# Patient Record
Sex: FEMALE | Race: BLACK OR AFRICAN AMERICAN
[De-identification: names, ages, dates, MRNs, and addresses within clinical notes are randomized per-mention and may not be internally consistent; named-entity substitution may affect disease eponyms.]

---

## 2017-01-27 NOTE — ER DOCUMENT REPORT
ED GI/





- General


Chief Complaint: Epigastric Pain


Stated Complaint: EPIGASTRIC PAIN


Time seen by provider: 20:41


Mode of Arrival: Ambulatory


Information source: Patient


Notes: 


45 yo smoker,hypertensive, non dm, hx GERD, non hyperlipidemic, no drugs, CNA, c

/o constant fullness and pain retrosternal since wednesday, each episode lasts 

few seconds. Worse after eating after fried foods and uses hot sauce. Had to 

make herself throw up yesterday at work because she felt so full when she ate.  

Taking maalox and it hasn't helped. Was sitting at the house tonight and was 

feeling reyes, felt better after burping but when come right back. Afraid to 

eat because when she swallows she feels like it gets stuck. Also thinks she 

still has mucous in the area, the throat and midline chest area feels thick. No 

pain anywhere else. No abdominal pain. No nausea or vomiting. No hx ulcer or 

EGD in past. Mild diarrhea this am. takes nexium and norvasc. Had black bm at 

work (peptobismol day before). Etoh-4-5 mixed drinks every friday, occ. wed and 

sat.


TRAVEL OUTSIDE OF THE [place] IN LAST 30 DAYS: No





- Related Data


Allergies/Adverse Reactions: 


 





No Known Drug Allergies Allergy (Verified 01/27/17 18:24)


 











Past Medical History





- General


Information source: Patient





- Social History


Smoking Status: Current Some Day Smoker


Chew tobacco use (# tins/day): No


Frequency of alcohol use: 3 times week 4-5 mixed drink


Drug Abuse: None


Lives with: Family


Family History: Reviewed & Not Pertinent


Patient has suicidal ideation: No


Patient has homicidal ideation: No





- Past Medical History


Cardiac Medical History: Reports: Hx Hypertension


Neurological Medical History: Reports: Hx Migraine


Renal/ Medical History: Denies: Hx Peritoneal Dialysis


Musculoskeltal Medical History: Reports Hx Arthritis - Low Back


Past Surgical History: Reports: Hx Tubal Ligation





- Immunizations


Hx Diphtheria, Pertussis, Tetanus Vaccination: Yes





Review of Systems





- Review of Systems


Constitutional: No symptoms reported


EENT: No symptoms reported


Cardiovascular: Chest pain - see HPI


Respiratory: No symptoms reported


Gastrointestinal: See HPI


Genitourinary: No symptoms reported


Female Genitourinary: No symptoms reported


Musculoskeletal: No symptoms reported


Skin: No symptoms reported


Hematologic/Lymphatic: No symptoms reported


Neurological/Psychological: No symptoms reported





Physical Exam





- Vital signs


Vitals: 


 











Temp Pulse Resp BP Pulse Ox


 


 98.1 F   84   18   140/86 H  99 


 


 01/27/17 18:17  01/27/17 18:17  01/27/17 18:17  01/27/17 18:17  01/27/17 18:17











Interpretation: Normal





- General


General appearance: Appears well, Alert


In distress: None





- HEENT


Head: Normocephalic, Atraumatic


Eyes: Normal


Conjunctiva: Normal


Pupils: PERRL


Mouth/Lips: Normal


Mucous membranes: Normal


Neck: Supple.  No: Lymphadenopathy





- Respiratory


Respiratory status: No respiratory distress


Chest status: Nontender


Breath sounds: Normal


Chest palpation: Normal





- Cardiovascular


Rhythm: Regular


Heart sounds: Normal auscultation


Murmur: No





- Abdominal


Inspection: Normal


Distension: No distension


Bowel sounds: Normal


Tenderness: Tender - epigastrum.  No: Varner's sign, Guarding, Rebound


Organomegaly: No organomegaly





- Rectal


Tenderness: No


Stool: Heme negative, Other - brown


Hemorrhoids: None





- Back


Back: Normal, Nontender.  No: CVA tenderness





- Extremities


General upper extremity: Normal inspection, Nontender, Normal color, Normal ROM

, Normal temperature


General lower extremity: Normal inspection, Nontender, Normal color, Normal ROM

, Normal temperature, Normal weight bearing.  No: Obinna's sign





- Neurological


Neuro grossly intact: Yes


Cognition: Normal


Orientation: AAOx4


[place] Coma Scale Eye Opening: Spontaneous


[place] Coma Scale Verbal: Oriented


[place] Coma Scale Motor: Obeys Commands


[place] Coma Scale Total: 15


Speech: Normal


Motor strength normal: LUE, RUE, LLE, RLE


Sensory: Normal





- Psychological


Associated symptoms: Normal affect, Normal mood





- Skin


Skin Temperature: Warm


Skin Moisture: Dry


Skin Color: Normal


Skin irregularity: negative: Rash





Course





- Re-evaluation


Re-evalutation: 


01/27/17 22:36


I have consulted with the supervisory physician Dr. Franklin per [facility] 

Guidelines. OK to dispo home with GI consult referral.. Labs OK, stool for hem 

is negative. Chest xray negative. EKG NSR>





01/27/17 22:50








- Vital Signs


Vital signs: 


 











Temp Pulse Resp BP Pulse Ox


 


 98.1 F   84   18   140/86 H  99 


 


 01/27/17 18:17  01/27/17 18:17  01/27/17 18:17  01/27/17 18:17  01/27/17 18:17














- Laboratory


Result Diagrams: 


 01/27/17 18:40





 01/27/17 18:40


Laboratory results interpreted by me: 


 











  01/27/17 01/27/17





  18:40 18:40


 


Hgb  10.2 L 


 


Hct  31.9 L 


 


MCHC  31.9 L 


 


RDW  19.4 H 


 


Creatine Kinase   140 H














Discharge





- Discharge


Clinical Impression: 


 Epigastric abdominal pain, retrosternal chest fullness/burning





Condition: Good


Disposition: HOME, SELF-CARE


Instructions:  Low-Fat Diet ([facility]), Evaluation of Upper Abdominal Pain ([facility]), 

Chest Pain of Unclear Cause ([facility]), Gastritis ([facility]), Prilosec (Acid Pump 

Inhibitor) ([facility]), Acid-Suppressing Medication ([facility]), Gastroenterology


Additional Instructions: 


stop alcohol consumption


low fat, bland diet


start taking over the counter pepcid 20mg twice a day with the nexium


to er if worse


schedule appt with Gastroenterologist for endoscopy





Referrals: 


PASQUALE HUA NP [Primary Care Provider] - Follow up as needed

## 2017-01-27 NOTE — ER DOCUMENT REPORT
ED Medical Screen (RME)





- General


Stated Complaint: EPIGASTRIC PAIN


Time seen by provider: 18:23


Mode of Arrival: Ambulatory


Information source: Patient


Notes: 


46-year-old female presents to ED for epigastric pain.  She states she feels 

very full and can hear rumbling in her stomach and in her throat.  He states 

she has reflux and is on Nexium but she continues to have pain and burning in 

her abdomen is rumbling constantly.








I have greeted and performed a rapid initial assessment of this patient.  A 

comprehensive ED assessment and evaluation of the patient, analysis of test 

results and completion of medical decision making process will be conducted by 

an additional ED providers.


TRAVEL OUTSIDE OF THE [place] IN LAST 30 DAYS: No





- Related Data


Allergies/Adverse Reactions: 


 





amlodipine besylate [From Norvasc] Allergy (Verified 09/29/14 03:46)


 











Past Medical History





- Past Medical History


Cardiac Medical History: Reports: Hx Hypertension


Neurological Medical History: Reports: Hx Migraine


Musculoskeltal Medical History: Reports Hx Arthritis - Low Back


Past Surgical History: Reports: Hx Tubal Ligation





- Immunizations


Hx Diphtheria, Pertussis, Tetanus Vaccination: Yes

## 2018-01-26 ENCOUNTER — HOSPITAL ENCOUNTER (OUTPATIENT)
Dept: HOSPITAL 62 - WI | Age: 48
End: 2018-01-26
Attending: NURSE PRACTITIONER
Payer: COMMERCIAL

## 2018-01-26 DIAGNOSIS — Z12.31: Primary | ICD-10-CM

## 2018-01-26 PROCEDURE — 77063 BREAST TOMOSYNTHESIS BI: CPT

## 2018-01-26 PROCEDURE — 77067 SCR MAMMO BI INCL CAD: CPT

## 2018-01-26 NOTE — WOMENS IMAGING REPORT
EXAM DESCRIPTION:  3D SCREENING MAMMO BILAT



COMPLETED DATE/TIME:  1/26/2018 11:27 am



REASON FOR STUDY:  SCREENING MAMMO Z12.31  ENCNTR SCREEN MAMMOGRAM FOR MALIGNANT NEOPLASM OF DASHA



COMPARISON:  9/10/2015 and 5/23/2014.



TECHNIQUE:  Standard craniocaudal and mediolateral oblique views of each breast recorded using digita
l acquisition and breast tomosynthesis.



LIMITATIONS:  None.



FINDINGS:  No masses, calcifications or architectural distortion. No areas of suspicion.

Read with the assistance of CAD.

.[facility] - R2 Cenova Version 1.3

.[facility] - R2 Cenova Version 1.3

.[facility] - R2 Cenova Version 2.4

.[facility] - R2 Cenova Version 2.4

.[facility] - R2  Version 9.2



IMPRESSION:  NORMAL MAMMOGRAM.  BIRADS 1.



BREAST DENSITY:  c. The breasts are heterogeneously dense, which may obscure small masses.



BIRAD:  1 NEGATIVE



RECOMMENDATION:  ROUTINE SCREENING



COMMENT:  The patient has been notified of the results by letter per MQSA requirements. Additional no
tification policies are in place for contacting patient with suspicious or incomplete findings.

Quality ID #225: The American College of Radiology recommends an annual screening mammogram for women
 aged 40 years or over. This facility utilizes a reminder system to ensure that all patients receive 
reminder letters, and/or direct phone calls for appointments. This includes reminders for routine scr
eening mammograms, diagnostic mammograms, or other Breast Imaging Interventions when appropriate.  Th
is patient will be placed in the appropriate reminder system.

The American College of Radiology (ACR) has developed recommendations for screening MRI of the breast
s in certain patient populations, to be used in conjunction with mammography.  Breast MRI surveillanc
e may be appropriate for women with more than 20% lifetime risk of developing breast cancer  as deter
mined by genetic testing, significant family history of the disease, or history of mantle radiation f
or Hodgkins Disease.  ACR Practice Guidelines 2008.

[employer]



PQRS 6045F: Fluoroscopic imaging is not utilized for breast tomosynthesis.



TECHNICAL DOCUMENTATION:  FINDING NUMBER: (1)

ASSESSMENT:  (1)

JOB ID:  [id number]

 2011 [employer]- All Rights Reserved

## 2018-11-09 ENCOUNTER — HOSPITAL ENCOUNTER (OUTPATIENT)
Dept: HOSPITAL 62 - RAD | Age: 48
End: 2018-11-09
Attending: NURSE PRACTITIONER
Payer: COMMERCIAL

## 2018-11-09 DIAGNOSIS — G60.9: ICD-10-CM

## 2018-11-09 DIAGNOSIS — M26.52: ICD-10-CM

## 2018-11-09 DIAGNOSIS — K22.4: ICD-10-CM

## 2018-11-09 DIAGNOSIS — M46.90: ICD-10-CM

## 2018-11-09 DIAGNOSIS — M25.561: ICD-10-CM

## 2018-11-09 DIAGNOSIS — M54.5: Primary | ICD-10-CM

## 2018-11-09 DIAGNOSIS — M50.10: ICD-10-CM

## 2018-11-09 DIAGNOSIS — R10.84: ICD-10-CM

## 2018-11-09 PROCEDURE — 72110 X-RAY EXAM L-2 SPINE 4/>VWS: CPT

## 2018-11-09 PROCEDURE — 72070 X-RAY EXAM THORAC SPINE 2VWS: CPT

## 2018-11-09 PROCEDURE — 72200 X-RAY EXAM SI JOINTS: CPT

## 2018-11-09 PROCEDURE — 72050 X-RAY EXAM NECK SPINE 4/5VWS: CPT

## 2018-11-09 NOTE — RADIOLOGY REPORT (SQ)
EXAM DESCRIPTION:  CERV SP 4 OR 5 VIEWS



COMPLETED DATE/TIME:  11/9/2018 11:27 am



REASON FOR STUDY:  CERVICAL DISC DISORDER W RADICULOPATHY, UNSP CERVICAL REGION M54.5  LOW BACK PAIN 
M25.561  PAIN IN RIGHT KNEE M26.52  LIMITED MANDIBULAR RANGE OF MOTION



COMPARISON:  None.



NUMBER OF VIEWS:  Five views.



TECHNIQUE:  AP, lateral, obliques and odontoid radiographic images acquired of the cervical spine.



LIMITATIONS:  None.



FINDINGS:  MINERALIZATION: Normal.

ALIGNMENT: Anatomic.

VERTEBRAE: Vertebral bodies of normal height.

DISCS: Moderate disc space loss at C 5 6 with mild disc space loss at C4-5 and C6-7.  Moderate-sized 
osteophytes.

FORAMINA: No osteophytes or foraminal narrowing.

LATERAL AND POSTERIOR ELEMENTS: Facets, lateral masses and spinous processes without significant find
ings.

HARDWARE: None in the spine.

SOFT TISSUES: No masses or calcifications. Lung apices clear.

OTHER: No other significant finding.



IMPRESSION:  Mild to moderate degenerative changes in the lower cervical spine.



TECHNICAL DOCUMENTATION:  JOB ID:  [id number]

 2011 [employer]- All Rights Reserved



Reading location - IP/workstation name: [email or web address]

## 2018-11-09 NOTE — RADIOLOGY REPORT (SQ)
EXAM DESCRIPTION:  KNEE LEFT 4 VIEW



COMPLETED DATE/TIME:  11/9/2018 11:27 am



REASON FOR STUDY:  KNEE PAIN M54.5  LOW BACK PAIN M25.561  PAIN IN RIGHT KNEE M26.52  LIMITED MANDIBU
LAR RANGE OF MOTION



COMPARISON:  None.



NUMBER OF VIEWS:  Five views.



TECHNIQUE:  AP, lateral, and both oblique radiographic images acquired of the left knee.



LIMITATIONS:  None.



FINDINGS:  MINERALIZATION: Normal.

BONES: No acute fracture or dislocation.  No worrisome bone lesions.

JOINT: No effusion.

SOFT TISSUES: No soft tissue swelling.  No radio-opaque foreign body.

OTHER: No other significant finding.



IMPRESSION:  No significant abnormalities involving the left knee.



TECHNICAL DOCUMENTATION:  JOB ID:  [id number]

 2011 [employer]- All Rights Reserved



Reading location - IP/workstation name: [email or web address]

## 2018-11-09 NOTE — RADIOLOGY REPORT (SQ)
EXAM DESCRIPTION:  SACROILIAC JOINTS



COMPLETED DATE/TIME:  11/9/2018 11:27 am



REASON FOR STUDY:  LOW BACK PAIN M54.5  LOW BACK PAIN M25.561  PAIN IN RIGHT KNEE M26.52  LIMITED MAN
DIBULAR RANGE OF MOTION



COMPARISON:  None.



NUMBER OF VIEWS:  Three views.



TECHNIQUE:  AP and oblique views of the sacroiliac joints.



LIMITATIONS:  None.



FINDINGS:  MINERALIZATION: Normal.

BONES: No acute fracture or dislocation. No worrisome bone lesions. Moderate degenerative disc diseas
e at L4-5.

JOINTS: The sacroiliac joints are patent.  No unusual widening, sclerosis, or fusion.

SOFT TISSUES: No soft tissue swelling.  No radio-opaque foreign body.

OTHER: No  other significant finding.



IMPRESSION:  1.  NORMAL STUDY OF THE SACROILIAC JOINTS.

2.  Moderate degenerative disc disease at L4-5



TECHNICAL DOCUMENTATION:  JOB ID:  [id number]

 2011 [employer]- All Rights Reserved



Reading location - IP/workstation name: [email or web address]

## 2018-11-09 NOTE — RADIOLOGY REPORT (SQ)
EXAM DESCRIPTION:  KNEE RIGHT 4 VIEWS



COMPLETED DATE/TIME:  11/9/2018 11:27 am



REASON FOR STUDY:  KNEE PAIN M54.5  LOW BACK PAIN M25.561  PAIN IN RIGHT KNEE M26.52  LIMITED MANDIBU
LAR RANGE OF MOTION



COMPARISON:  None.



NUMBER OF VIEWS:  Four views.



TECHNIQUE:  AP, lateral, and both oblique radiographic images acquired of the right knee.



LIMITATIONS:  None.



FINDINGS:  MINERALIZATION: Normal.

BONES: No acute fracture or dislocation.  No worrisome bone lesions.

JOINT: No effusion.

SOFT TISSUES: No soft tissue swelling.  No radio-opaque foreign body.

OTHER: No other significant finding.



IMPRESSION:  No significant abnormalities involving the right knee.



TECHNICAL DOCUMENTATION:  JOB ID:  [id number]

 2011 [email or web address]- All Rights Reserved



Reading location - IP/workstation name: [email or web address]

## 2018-11-09 NOTE — RADIOLOGY REPORT (SQ)
EXAM DESCRIPTION:  T SPINE AP/LAT



COMPLETED DATE/TIME:  11/9/2018 11:27 am



REASON FOR STUDY:  UNSPEC INFLAMMATORY SPONDYLOPATHY, SITE UNSPEC M54.5  LOW BACK PAIN M25.561  PAIN 
IN RIGHT KNEE M26.52  LIMITED MANDIBULAR RANGE OF MOTION



COMPARISON:  None.



NUMBER OF VIEWS:  Two views.



TECHNIQUE:  AP and lateral radiographic images acquired of the thoracic spine.



LIMITATIONS:  None.



FINDINGS:  MINERALIZATION: Normal.

ALIGNMENT: Minimal scoliosis convex to the right.

VERTEBRAE: No fracture or bone lesion.  Maintained height, normal segmentation.

DISCS: Multilevel mild to moderate degenerative disc disease.

HARDWARE: None in the spine.

MEDIASTINUM AND SOFT TISSUES: Normal heart size and aortic contour.  No soft tissue abnormality.

VISUALIZED LUNG FIELDS: Clear.

OTHER: No other significant finding.



IMPRESSION:  Multilevel mild to moderate degenerative disc disease in the thoracic spine.



TECHNICAL DOCUMENTATION:  JOB ID:  [id number]

 2011 [email or web address]- All Rights Reserved



Reading location - IP/workstation name: [email or web address]

## 2018-11-09 NOTE — RADIOLOGY REPORT (SQ)
EXAM DESCRIPTION:  L SPINE WHOLE



COMPLETED DATE/TIME:  11/9/2018 11:27 am



REASON FOR STUDY:  LOW BACK PAIN M54.5  LOW BACK PAIN M25.561  PAIN IN RIGHT KNEE M26.52  LIMITED MAN
DIBULAR RANGE OF MOTION



COMPARISON:  None.



NUMBER OF VIEWS:  Five views including obliques.



TECHNIQUE:  AP, lateral, oblique, and sacral radiographic images acquired of the lumbar spine.



LIMITATIONS:  None.



FINDINGS:  MINERALIZATION: Normal.

SEGMENTATION: Normal.  No transitional anatomy.

ALIGNMENT: Minimal scoliosis.

VERTEBRAE: Maintained height.  No fracture or worrisome bone lesion.

DISCS: Moderate degenerative disc disease at L4-5.  Remaining disc spaces are well maintained.

POSTERIOR ELEMENTS: Pedicles and facets are intact.  No pars defect or posterior arch defects.

HARDWARE: None in the spine.

PARASPINAL SOFT TISSUES: Normal.

PELVIS: Intact as visualized. No fractures or worrisome bone lesions. SI joints intact.

OTHER: No other significant finding.



IMPRESSION:  Moderate degenerative disc disease at L4-5.



TECHNICAL DOCUMENTATION:  JOB ID:  [id number]

 2011 [employer]- All Rights Reserved



Reading location - IP/workstation name: [email or web address]

## 2019-06-03 ENCOUNTER — HOSPITAL ENCOUNTER (OUTPATIENT)
Dept: HOSPITAL 62 - WI | Age: 49
End: 2019-06-03
Attending: NURSE PRACTITIONER
Payer: COMMERCIAL

## 2019-06-03 DIAGNOSIS — Z12.31: Primary | ICD-10-CM

## 2019-06-03 PROCEDURE — 77067 SCR MAMMO BI INCL CAD: CPT

## 2019-06-03 PROCEDURE — 77063 BREAST TOMOSYNTHESIS BI: CPT

## 2019-06-03 NOTE — WOMENS IMAGING REPORT
EXAM DESCRIPTION:  3D SCREENING MAMMO BILAT



COMPLETED DATE/TIME:  6/3/2019 8:25 am



REASON FOR STUDY:  Z12.31 ROUTINE 3D BILATERAL SCREENING Z12.31  ENCNTR SCREEN MAMMOGRAM FOR MALIGNAN
T NEOPLASM OF DASHA



COMPARISON:  [id number]



EXAM PARAMETERS:  Views: Standard craniocaudal and mediolateral oblique views of each breast recorded
 using digital acquisition and breast tomosynthesis.

Read with the assistance of CAD.

.[facility] - Revl  Version 9.2



LIMITATIONS:  None.



FINDINGS:  No suspicious masses, suspicious calcifications or architectural distortion. No areas of c
oncern.



IMPRESSION:   Assessment:  Negative MAMMOGRAM.  BIRADS 1.



BREAST DENSITY:  b. There are scattered areas of fibroglandular density.



BIRAD:  1 NEGATIVE



RECOMMENDATION:  ROUTINE SCREENING



COMMENT:  The patient has been notified of the results by letter per MQSA requirements. Additional no
tification policies are in place for contacting patient with suspicious or incomplete findings.

Quality ID #225: The American College of Radiology recommends an annual screening mammogram for women
 aged 40 years or over. This facility utilizes a reminder system to ensure that all patients receive 
reminder letters, and/or direct phone calls for appointments. This includes reminders for routine scr
eening mammograms, diagnostic mammograms, or other Breast Imaging Interventions when appropriate.  Th
is patient will be placed in the appropriate reminder system.



TECHNICAL DOCUMENTATION:  FINDING NUMBER: (1)

ASSESSMENT:  (1)

JOB ID:  [id number]

 2011 [employer]- All Rights Reserved



Reading location - IP/workstation name: [email or web address]

## 2019-07-26 ENCOUNTER — HOSPITAL ENCOUNTER (OUTPATIENT)
Dept: HOSPITAL 62 - RAD | Age: 49
End: 2019-07-26
Attending: NURSE PRACTITIONER
Payer: COMMERCIAL

## 2019-07-26 DIAGNOSIS — M25.562: ICD-10-CM

## 2019-07-26 DIAGNOSIS — R29.898: ICD-10-CM

## 2019-07-26 DIAGNOSIS — M25.561: Primary | ICD-10-CM

## 2019-07-26 NOTE — RADIOLOGY REPORT (SQ)
EXAM DESCRIPTION:  KNEE LEFT 4 VIEW



COMPLETED DATE/TIME:  7/26/2019 2:35 pm



REASON FOR STUDY:  BILATERAL KNEE PAIN (M25.561, M25.562) M25.561  PAIN IN RIGHT KNEE M25.562  PAIN I
N LEFT KNEE R29.898  OTH SYMPTOMS AND SIGNS INVOLVING THE MUSCULOSKELETAL



COMPARISON:  None.



NUMBER OF VIEWS:  Four views.



TECHNIQUE:  AP, lateral, and both oblique radiographic images acquired of the left knee.



LIMITATIONS:  None.



FINDINGS:  MINERALIZATION: Normal.

BONES: No acute fracture or dislocation. No worrisome bone lesions. No significant osteophytes.

JOINT: No effusion.  No chondrocalcinosis.

OTHER: No other significant finding.



IMPRESSION:  NEGATIVE STUDY OF THE LEFT KNEE. NO EXPLANATION FOR PAIN.



TECHNICAL DOCUMENTATION:  JOB ID:  [id number]

 2011 [employer]- All Rights Reserved



Reading location - IP/workstation name: [email or web address]

## 2019-07-26 NOTE — RADIOLOGY REPORT (SQ)
EXAM DESCRIPTION:  KNEE RIGHT 4 VIEWS



COMPLETED DATE/TIME:  7/26/2019 2:35 pm



REASON FOR STUDY:  BILATERAL KNEE PAIN (M25.561, M25.562) M25.561  PAIN IN RIGHT KNEE M25.562  PAIN I
N LEFT KNEE R29.898  [facility] SYMPTOMS AND SIGNS INVOLVING THE MUSCULOSKELETAL



COMPARISON:  11/9/2018



NUMBER OF VIEWS:  Four views.



TECHNIQUE:  AP, lateral, and both oblique radiographic images acquired of the right knee.



LIMITATIONS:  None.



FINDINGS:  MINERALIZATION: Normal.

BONES: No acute fracture or dislocation. No worrisome bone lesions. No significant osteophytes.

JOINT: No effusion.  No chondrocalcinosis.

OTHER: No other significant finding.



IMPRESSION:  NEGATIVE STUDY OF THE RIGHT KNEE. NO EXPLANATION FOR PAIN.



TECHNICAL DOCUMENTATION:  JOB ID:  [id number]

 2011 [employer]- All Rights Reserved



Reading location - IP/workstation name: [email or web address]

## 2020-03-27 ENCOUNTER — HOSPITAL ENCOUNTER (OUTPATIENT)
Dept: HOSPITAL 62 - RAD | Age: 50
End: 2020-03-27
Attending: NURSE PRACTITIONER
Payer: COMMERCIAL

## 2020-03-27 DIAGNOSIS — K44.9: ICD-10-CM

## 2020-03-27 DIAGNOSIS — K21.9: Primary | ICD-10-CM

## 2020-03-27 DIAGNOSIS — R13.10: ICD-10-CM

## 2020-03-27 PROCEDURE — 74220 X-RAY XM ESOPHAGUS 1CNTRST: CPT

## 2020-03-27 NOTE — RADIOLOGY REPORT (SQ)
EXAM DESCRIPTION:  BARIUM SWALLOW ESOPHAGUS



COMPLETED DATE/TIME:  3/27/2020 8:25 am



REASON FOR STUDY:  GERD (K21.9), DYSPHAGIA (R13.10) K21.9  GASTRO-ESOPHAGEAL REFLUX DISEASE WITHOUT E
SOPHAGITIS R13.10  DYSPHAGIA, UNSPECIFIED



COMPARISON:  None.



TECHNIQUE:  Under fluoroscopic guidance, patient ingested effervescent granules followed by thick and
 thin barium. Fluoroscopic spot images and routine radiographic images acquired and stored on PACS.

12 MM BARIUM TABLET GIVEN: Yes.

No significant delay in passage.



LIMITATIONS:  None.



FLUOROSCOPY TIME:  FLUORO TIME: 2.2 minutes of fluoroscopy was used.

11 images saved to PACS.



FINDINGS:  NEUROMUSCULAR COORDINATION OF SWALLOW: Normal. No aspiration.

ESOPHAGEAL MOTILITY: Weak primary peristalsis with stasis of barium throughout the esophagus. No esop
hageal spasm.

ESOPHAGEAL MUCOSA: Normal mucosa without masses or ulceration.

GASTRO-ESOPHAGEAL JUNCTION: Small sliding hiatal hernia with mild gastroesophageal reflux.

NON-GI TRACT STRUCTURES: No significant finding.

OTHER: No other significant finding.



IMPRESSION:  ESOPHAGEAL DYSMOTILITY WITH SMALL SLIDING HIATAL HERNIA AND MILD GASTROESOPHAGEAL REFLUX
.



COMMENT:  Quality :  Final reports for procedures using fluoroscopy that document radiation exp
osure indices, or exposure time and number of fluorographic images (if radiation exposure indices are
 not available)



TECHNICAL DOCUMENTATION:  JOB ID:  [id number]

 2011 [employer]- All Rights Reserved



Reading location - IP/workstation name: [email or web address] [id number]

## 2020-04-17 ENCOUNTER — HOSPITAL ENCOUNTER (OUTPATIENT)
Dept: HOSPITAL 62 - RAD | Age: 50
End: 2020-04-17
Attending: INTERNAL MEDICINE
Payer: COMMERCIAL

## 2020-04-17 DIAGNOSIS — R07.9: Primary | ICD-10-CM

## 2020-04-17 PROCEDURE — 76705 ECHO EXAM OF ABDOMEN: CPT

## 2020-04-17 NOTE — RADIOLOGY REPORT (SQ)
EXAM DESCRIPTION:  U/S ABDOMEN LIMITED W/O DOP



IMAGES COMPLETED DATE/TIME:  4/17/2020 9:04 am



REASON FOR STUDY:  CHEST PAIN, UNSPECIFIED R07.9  CHEST PAIN, UNSPECIFIED



COMPARISON:  None.



TECHNIQUE:  Dynamic and static grayscale images acquired of the abdomen and recorded on PACS. Additio
nal selected color Doppler and spectral images recorded.



LIMITATIONS:  None.



FINDINGS:  PANCREAS: The visualized portions of the pancreas appear normal.

LIVER: The echogenicity of hepatic parenchyma is increased compared to that of the renal parenchymal.


LIVER VASCULATURE: Hepatopetal directional flow within the portal veins.  The hepatic veins are paten
t.

GALLBLADDER: The gallbladder wall measures 2.8 mm in thickness.  There is no cholelithiasis, sludge o
r pericholecystic fluid.

ULTRASOUND-DETECTED POE'S SIGN: Negative.

INTRAHEPATIC DUCTS AND COMMON DUCT: The common bile duct measures 4.5 mm in diameter.  The intrahepat
ic bile ducts are normal in caliber.

INFERIOR VENA CAVA: Patent.

AORTA: No aneurysm.

RIGHT KIDNEY:  The right kidney measures 10.2 cm in length.  There is no hydronephrosis.

PERITONEAL AND RIGHT PLEURAL SPACE: No ascites or effusions.

OTHER: No other findings.



IMPRESSION:  1. Increased echogenicity of hepatic parenchyma.  The finding is indicative of diffuse h
epatocellular disease the most common etiology being hepatic steatosis.

2.  No other abnormality.



TECHNICAL DOCUMENTATION:  JOB ID:  [id number]

 2011 [employer]- All Rights Reserved



Reading location - IP/workstation name: [email or web address]

## 2020-10-02 ENCOUNTER — HOSPITAL ENCOUNTER (OUTPATIENT)
Dept: HOSPITAL 62 - WI | Age: 50
End: 2020-10-02
Attending: NURSE PRACTITIONER
Payer: COMMERCIAL

## 2020-10-02 DIAGNOSIS — Z12.31: Primary | ICD-10-CM

## 2020-10-02 DIAGNOSIS — N64.89: ICD-10-CM

## 2020-10-02 DIAGNOSIS — Z80.3: ICD-10-CM

## 2020-10-02 PROCEDURE — 77063 BREAST TOMOSYNTHESIS BI: CPT

## 2020-10-02 PROCEDURE — 77067 SCR MAMMO BI INCL CAD: CPT

## 2020-10-02 NOTE — WOMENS IMAGING REPORT
EXAM DESCRIPTION:  3D SCREENING MAMMO BILAT



IMAGES COMPLETED DATE/TIME:  10/2/2020 11:02 am



REASON FOR STUDY:  Z12.31 ENCOUNTER FOR SCREENING MAMMOGRAM FOR MALIGNANT NEOPLASM OF BREAST Z12.31  
ENCNTR SCREEN MAMMOGRAM FOR MALIGNANT NEOPLASM OF DASHA



COMPARISON:  2015 and subsequent.



EXAM PARAMETERS:  Standard craniocaudal and mediolateral oblique views of each breast recorded using 
digital acquisition and breast tomosynthesis.

Read with the assistance of CAD.

.[facility] - R2  Version 9.2



LIMITATIONS:  None.



FINDINGS:  RIGHT BREAST

MASSES: No suspicious masses.

CALCIFICATIONS: No new or suspicious calcifications.

ARCHITECTURAL DISTORTION: None.

ASYMMETRY: Focal asymmetry MLO view only, likely upper-outer quadrant 7 cm from the nipple.

OTHER: No other significant findings.

LEFT BREAST

MASSES: No suspicious masses.

CALCIFICATIONS: No new or suspicious calcifications.

ARCHITECTURAL DISTORTION: None.

ASYMMETRY: None noted.

OTHER: No other significant findings.



IMPRESSION:  Focal asymmetry right breast may simply represent under compressed tissue, however furth
er evaluation is warranted with diagnostic right spot compression mammography and potentially ultraso
und.

0 Incomplete: Needs Additional Imaging Evaluation and/or prior Mammograms for Comparison.



BREAST DENSITY:  c. The breasts are heterogeneously dense, which may obscure small masses.



BIRAD:  ASSESSMENT:  0 Incomplete:  Needs Additional Imaging Evaluation and/or prior Mammograms for C
omparison.



RECOMMENDATION:  RECOMMENDED FOLLOW-UP: As above.

The patient will be contacted for additional imaging.



COMMENT:  The patient has been notified of the results by letter per MQSA requirements. Additional no
tification policies are in place for contacting patient with suspicious or incomplete findings.

Quality ID #225: The American College of Radiology recommends an annual screening mammogram for women
 aged 40 years or over. This facility utilizes a reminder system to ensure that all patients receive 
reminder letters, and/or direct phone calls for appointments. This includes reminders for routine scr
eening mammograms, diagnostic mammograms, or other Breast Imaging Interventions when appropriate.  Th
is patient will be placed in the appropriate reminder system.



TECHNICAL DOCUMENTATION:  FINDING NUMBER: (1)

ASSESSMENT: (1)

JOB ID:  [id number]

 2011 [employer]- All Rights Reserved



Reading location - IP/workstation name: [email or web address]

## 2020-10-16 ENCOUNTER — HOSPITAL ENCOUNTER (OUTPATIENT)
Dept: HOSPITAL 62 - RAD | Age: 50
End: 2020-10-16
Attending: INTERNAL MEDICINE
Payer: COMMERCIAL

## 2020-10-16 DIAGNOSIS — M51.36: ICD-10-CM

## 2020-10-16 DIAGNOSIS — R10.13: ICD-10-CM

## 2020-10-16 DIAGNOSIS — R07.9: Primary | ICD-10-CM

## 2020-10-16 PROCEDURE — 74170 CT ABD WO CNTRST FLWD CNTRST: CPT

## 2020-10-16 PROCEDURE — 82565 ASSAY OF CREATININE: CPT

## 2020-10-16 NOTE — RADIOLOGY REPORT (SQ)
EXAM DESCRIPTION:  CT ABDOMEN COMBO



IMAGES COMPLETED DATE/TIME:  10/16/2020 9:48 am



REASON FOR STUDY:  R07.9 CHEST PAIN, UNSPECIFIED R10.13 EPIGASTRIC PAIN R07.9  CHEST PAIN, UNSPECIFIE
D R10.13  EPIGASTRIC PAIN



COMPARISON:  None.



TECHNIQUE:  CT scan of the abdomen performed with and without intravenous contrast, and with oral con
trast. Contrasted imaging performed using helical scanning technique with dynamic intravenous contras
t injection. Images reviewed with lung, soft tissue, and bone windows. Reconstructed coronal and sagi
ttal MPR images reviewed. Delayed images for evaluation of the urinary system also acquired and evalu
ated. All images stored on PACS.

All CT scanners at this facility use dose modulation, iterative reconstruction, and/or weight based d
osing when appropriate to reduce radiation dose to as low as reasonably achievable (ALARA).

[facility]: Dose Right  [facility]: CareDose    [facility]: Dose Right    [facility]: Teradose 4D    [facility]: Smart Technologies



CONTRAST TYPE AND DOSE:  contrast/concentration: Isovue 350.00 mmol/ml; Total Contrast Delivered: 80.
0 ml; Total Saline Delivered: 45.0 ml



RENAL FUNCTION:  Creatinine 0.5



RADIATION DOSE:  CT Rad equipment meets quality standard of care and radiation dose reduction techniq
ues were employed. CTDIvol: 9.6 - 10.3 mGy. DLP: 1060 mGy-cm..



LIMITATIONS:  None.



FINDINGS:  NONCONTRASTED IMAGING: See below

POSTCONTRASTED IMAGING:

LOWER CHEST: No significant findings. No nodules or infiltrates.

LIVER: Normal size. No masses.  No dilated ducts.

SPLEEN: Normal size. No focal lesions.

PANCREAS: No masses. No significant calcifications. No adjacent inflammation or peripancreatic fluid 
collections. Pancreatic duct not dilated.

GALLBLADDER: No identified stones by CT criteria. No inflammatory changes to suggest cholecystitis.

ADRENAL GLANDS: No significant masses or asymmetry.

RIGHT KIDNEY AND URETER: No solid masses.   No significant calcifications.   No hydronephrosis or hyd
roureter.

LEFT KIDNEY AND URETER: No solid masses.   No significant calcifications.   No hydronephrosis or hydr
oureter.

AORTA AND VESSELS: No aneurysm. No dissection. Renal arteries, SMA, celiac without stenosis.

RETROPERITONEUM: No retroperitoneal adenopathy, hemorrhage or masses.

BOWEL AND PERITONEAL CAVITY: No evidence of intestinal obstruction.  No focal bowel wall thickening. 
 Few scattered colonic diverticula.

APPENDIX: Not well delineated.

ABDOMINAL WALL: Small fat containing paraumbilical hernia.

BONES: No acute findings.  No suspicious osseous lesions.  Degenerative changes at L4-5 with disc hei
ght loss and irregular endplate sclerosis, similar to radiograph dated 2018.  .

OTHER: No other significant finding.



IMPRESSION:  1.  No evidence of acute intra-abdominal process.  Tiny fat containing paraumbilical her
heidi.

2.  Degenerative disc height loss and endplate change at L4-5, similar to 11/9/2018.



TECHNICAL DOCUMENTATION:  JOB ID:  [id number]

Quality ID # [id number]: Final reports with documentation of one or more dose reduction techniques (e.g., Au
tomated exposure control, adjustment of the mA and/or kV according to patient size, use of iterative 
reconstruction technique)

 2011 [employer]- All Rights Reserved



Reading location - IP/workstation name: [email or web address]-

## 2020-10-21 ENCOUNTER — HOSPITAL ENCOUNTER (OUTPATIENT)
Dept: HOSPITAL 62 - WI | Age: 50
End: 2020-10-21
Attending: NURSE PRACTITIONER
Payer: COMMERCIAL

## 2020-10-21 DIAGNOSIS — N63.11: ICD-10-CM

## 2020-10-21 DIAGNOSIS — R92.8: Primary | ICD-10-CM

## 2020-10-21 PROCEDURE — 77065 DX MAMMO INCL CAD UNI: CPT

## 2020-10-21 PROCEDURE — 76642 ULTRASOUND BREAST LIMITED: CPT

## 2020-10-22 NOTE — WOMENS IMAGING REPORT
EXAM DESCRIPTION:  RIGHT DIAGNOSTIC MAMMO W/CAD



IMAGES COMPLETED DATE/TIME:  10/21/2020 11:00 am



REASON FOR STUDY:  R92.8 OTHER ABNORMAL AND INCONCLUSIVE FINDINGS ON DIAGNOSTIC IMAGING OF DASHA N63.11
  UNSPECIFIED LUMP IN THE RIGHT BREAST, UPPER OUTER KAMARI



COMPARISON:  Multiple since 2012



EXAM PARAMETERS:  Exaggerated right craniocaudad, cone compression mediolateral oblique images of the
 right breast recorded with digital acquisition.

Additional right breast 90 mediolateral tomosynthesis performed.

Read with the assistance of CAD.

.[facility] - [employer]  Version 9.2



LIMITATIONS:  None.



FINDINGS:  BREAST LATERALITY: Right

MASSES: No suspicious masses.

CALCIFICATIONS: No new or suspicious calcifications.

ARCHITECTURAL DISTORTION: None.

ASYMMETRY: None noted.

OTHER: No other significant findings.



IMPRESSION:  No mammographic/ tomosynthesis evidence for malignancy right breast



BREAST DENSITY:  c. The breasts are heterogeneously dense, which may obscure small masses.



BIRAD:  ASSESSMENT:  1 Negative.



RECOMMENDATION:  RECOMMENDED FOLLOW UP: Please continue yearly bilateral screening mammography/ tomos
ynthesis in October 2021

SPECIFIC INTERVENTION/IMAGING/CONSULTATION RECOMMENDED:No additional intervention/ imaging/consultati
on needed at this time.

COMMUNICATION:The negative/benign results were communicated to the patient.



COMMENT:  The patient has been notified of the results by letter per SA requirements. Additional no
tification policies are in place for contacting patient with suspicious or incomplete findings.

Quality ID #225: The American College of Radiology recommends an annual screening mammogram for women
 aged 40 years or over. This facility utilizes a reminder system to ensure that all patients receive 
reminder letters, and/or direct phone calls for appointments. This includes reminders for routine scr
eening mammograms, diagnostic mammograms, or other Breast Imaging Interventions when appropriate.  Th
is patient will be placed in the appropriate reminder system.



TECHNICAL DOCUMENTATION:  FINDING NUMBER: (1)

ASSESSMENT: (1)

JOB ID:  [id number]

 2011 [employer]- All Rights Reserved



Reading location - IP/workstation name: [id number]

## 2021-01-02 ENCOUNTER — HOSPITAL ENCOUNTER (EMERGENCY)
Dept: HOSPITAL 62 - ER | Age: 51
Discharge: HOME | End: 2021-01-02
Payer: COMMERCIAL

## 2021-01-02 VITALS — DIASTOLIC BLOOD PRESSURE: 88 MMHG | SYSTOLIC BLOOD PRESSURE: 140 MMHG

## 2021-01-02 DIAGNOSIS — R53.81: ICD-10-CM

## 2021-01-02 DIAGNOSIS — Z79.899: ICD-10-CM

## 2021-01-02 DIAGNOSIS — Z20.822: ICD-10-CM

## 2021-01-02 DIAGNOSIS — R53.83: ICD-10-CM

## 2021-01-02 DIAGNOSIS — Z79.1: ICD-10-CM

## 2021-01-02 DIAGNOSIS — K92.1: ICD-10-CM

## 2021-01-02 DIAGNOSIS — E86.0: ICD-10-CM

## 2021-01-02 DIAGNOSIS — I10: ICD-10-CM

## 2021-01-02 DIAGNOSIS — K52.9: Primary | ICD-10-CM

## 2021-01-02 DIAGNOSIS — R42: ICD-10-CM

## 2021-01-02 LAB
ADD MANUAL DIFF: NO
ALBUMIN SERPL-MCNC: 4.1 G/DL (ref 3.5–5)
ALP SERPL-CCNC: 81 U/L (ref 38–126)
ANION GAP SERPL CALC-SCNC: 9 MMOL/L (ref 5–19)
APPEARANCE UR: (no result)
APTT PPP: YELLOW S
AST SERPL-CCNC: 22 U/L (ref 14–36)
BASOPHILS # BLD AUTO: 0 10^3/UL (ref 0–0.2)
BASOPHILS NFR BLD AUTO: 0.6 % (ref 0–2)
BILIRUB DIRECT SERPL-MCNC: 0.2 MG/DL (ref 0–0.4)
BILIRUB SERPL-MCNC: 0.3 MG/DL (ref 0.2–1.3)
BILIRUB UR QL STRIP: NEGATIVE
BUN SERPL-MCNC: 12 MG/DL (ref 7–20)
CALCIUM: 9.3 MG/DL (ref 8.4–10.2)
CHLORIDE SERPL-SCNC: 107 MMOL/L (ref 98–107)
CO2 SERPL-SCNC: 24 MMOL/L (ref 22–30)
EOSINOPHIL # BLD AUTO: 0 10^3/UL (ref 0–0.6)
EOSINOPHIL NFR BLD AUTO: 0.4 % (ref 0–6)
ERYTHROCYTE [DISTWIDTH] IN BLOOD BY AUTOMATED COUNT: 16.1 % (ref 11.5–14)
GLUCOSE SERPL-MCNC: 92 MG/DL (ref 75–110)
GLUCOSE UR STRIP-MCNC: NEGATIVE MG/DL
HCT VFR BLD CALC: 34.2 % (ref 36–47)
HGB BLD-MCNC: 11.4 G/DL (ref 12–15.5)
KETONES UR STRIP-MCNC: NEGATIVE MG/DL
LYMPHOCYTES # BLD AUTO: 1.2 10^3/UL (ref 0.5–4.7)
LYMPHOCYTES NFR BLD AUTO: 29.7 % (ref 13–45)
MCH RBC QN AUTO: 28.2 PG (ref 27–33.4)
MCHC RBC AUTO-ENTMCNC: 33.4 G/DL (ref 32–36)
MCV RBC AUTO: 84 FL (ref 80–97)
MONOCYTES # BLD AUTO: 0.3 10^3/UL (ref 0.1–1.4)
MONOCYTES NFR BLD AUTO: 8.1 % (ref 3–13)
NEUTROPHILS # BLD AUTO: 2.5 10^3/UL (ref 1.7–8.2)
NEUTS SEG NFR BLD AUTO: 61.2 % (ref 42–78)
NITRITE UR QL STRIP: NEGATIVE
PH UR STRIP: 5 [PH] (ref 5–9)
PLATELET # BLD: 267 10^3/UL (ref 150–450)
POTASSIUM SERPL-SCNC: 4 MMOL/L (ref 3.6–5)
PROT SERPL-MCNC: 7.8 G/DL (ref 6.3–8.2)
PROT UR STRIP-MCNC: NEGATIVE MG/DL
RBC # BLD AUTO: 4.05 10^6/UL (ref 3.72–5.28)
SP GR UR STRIP: 1.03
TOTAL CELLS COUNTED % (AUTO): 100 %
UROBILINOGEN UR-MCNC: NEGATIVE MG/DL (ref ?–2)
WBC # BLD AUTO: 4 10^3/UL (ref 4–10.5)

## 2021-01-02 PROCEDURE — 83690 ASSAY OF LIPASE: CPT

## 2021-01-02 PROCEDURE — 84703 CHORIONIC GONADOTROPIN ASSAY: CPT

## 2021-01-02 PROCEDURE — 85025 COMPLETE CBC W/AUTO DIFF WBC: CPT

## 2021-01-02 PROCEDURE — 81001 URINALYSIS AUTO W/SCOPE: CPT

## 2021-01-02 PROCEDURE — 82270 OCCULT BLOOD FECES: CPT

## 2021-01-02 PROCEDURE — 80053 COMPREHEN METABOLIC PANEL: CPT

## 2021-01-02 PROCEDURE — 93005 ELECTROCARDIOGRAM TRACING: CPT

## 2021-01-02 PROCEDURE — 84484 ASSAY OF TROPONIN QUANT: CPT

## 2021-01-02 PROCEDURE — 36415 COLL VENOUS BLD VENIPUNCTURE: CPT

## 2021-01-02 PROCEDURE — 99285 EMERGENCY DEPT VISIT HI MDM: CPT

## 2021-01-02 PROCEDURE — 96361 HYDRATE IV INFUSION ADD-ON: CPT

## 2021-01-02 PROCEDURE — 76705 ECHO EXAM OF ABDOMEN: CPT

## 2021-01-02 PROCEDURE — 96374 THER/PROPH/DIAG INJ IV PUSH: CPT

## 2021-01-02 PROCEDURE — 71045 X-RAY EXAM CHEST 1 VIEW: CPT

## 2021-01-02 PROCEDURE — 93010 ELECTROCARDIOGRAM REPORT: CPT

## 2021-01-02 NOTE — RADIOLOGY REPORT (SQ)
EXAM DESCRIPTION:  U/S ABDOMEN LIMITED W/O DOP



IMAGES COMPLETED DATE/TIME:  1/2/2021 3:12 pm



REASON FOR STUDY:  upper abd pain



COMPARISON:  None.



TECHNIQUE:  Dynamic and static grayscale images acquired of the abdomen and recorded on PACS. Additio
nal selected color Doppler and spectral images recorded.



LIMITATIONS:  None.



FINDINGS:  PANCREAS: No masses.  Visualized pancreatic duct normal caliber.

LIVER: No masses. Echotexture normal.

LIVER VASCULATURE: Normal directional flow of the main portal vein and hepatic veins.

GALLBLADDER: No stones. Normal wall thickness. No pericholecystic fluid.

ULTRASOUND-DETECTED POE'S SIGN: Negative.

INTRAHEPATIC DUCTS AND COMMON DUCT: CBD and intrahepatic ducts normal caliber. No filling defects.

INFERIOR VENA CAVA: Normal flow.

AORTA: No aneurysm.

RIGHT KIDNEY:  Normal size. Normal echogenicity. No solid or suspicious masses. No hydronephrosis. No
 calcifications.

PERITONEAL AND RIGHT PLEURAL SPACE: No ascites or effusions.

OTHER: No other significant findings.



IMPRESSION:  NORMAL RIGHT UPPER QUADRANT ULTRASOUND.



TECHNICAL DOCUMENTATION:  JOB ID:  [id number]

 [employer]- All Rights Reserved



Reading location - IP/workstation name: [email or web address]

## 2021-01-02 NOTE — ER DOCUMENT REPORT
ED Medical Screen (RME)





- General


Chief Complaint: Abdominal Pain


Stated Complaint: ABDOMINAL PAIN,WEAKNESS


Time Seen by Provider: 01/02/21 13:16


Primary Care Provider: 


PASQUALE HUA NP [Primary Care Provider] - Follow up as needed


Notes: 





Patient presents complaining of feeling lightheaded with nausea vomiting and 

diarrhea.  Patient reports dark urine and is concerned that she may be 

dehydrated.  Patient does complain of some epigastric pain with mild cough.  

Patient denies fever.  Patient reports dark-colored urine.  Patient complains of

generalized fatigue and not feeling well.





I have greeted and performed a rapid initial assessment of this patient.  A 

comprehensive ED assessment and evaluation of the patient, analysis of test 

results and completion of the medical decision making process will be conducted 

by additional ED providers.


TRAVEL OUTSIDE OF THE [place] IN LAST 30 DAYS: No





- Related Data


Allergies/Adverse Reactions: 


                                        





No Known Drug Allergies Allergy (Verified 01/27/17 18:24)


   











Past Medical History





- Past Medical History


Cardiac Medical History: Reports: Hx Hypertension


Neurological Medical History: Reports: Hx Migraine


Renal/ Medical History: Denies: Hx Peritoneal Dialysis


Musculoskeltal Medical History: Reports Hx Arthritis - Low Back


Past Surgical History: Reports: Hx Tubal Ligation





- Immunizations


Hx Diphtheria, Pertussis, Tetanus Vaccination: Yes





Physical Exam





- Vital signs


Vitals: 





                                        











Temp Pulse Resp BP Pulse Ox


 


 98.6 F   103 H  20   156/84 H  97 


 


 01/02/21 12:01  01/02/21 12:01 01/02/21 12:01 01/02/21 12:01 01/02/21 12:01














- Abdominal


Tenderness: Tender - epigastric





Course





- Vital Signs


Vital signs: 





                                        











Temp Pulse Resp BP Pulse Ox


 


 98.6 F   103 H  20   156/84 H  97 


 


 01/02/21 12:01 01/02/21 12:01 01/02/21 12:01 01/02/21 12:01 01/02/21 12:01














Doctor's Discharge





- Discharge


Referrals: 


PASQUALE HUA NP [Primary Care Provider] - Follow up as needed

## 2021-01-02 NOTE — RADIOLOGY REPORT (SQ)
EXAM DESCRIPTION:  CHEST SINGLE VIEW



IMAGES COMPLETED DATE/TIME:  1/2/2021 1:57 pm



REASON FOR STUDY:  cough



COMPARISON:  1/27/2017



EXAM PARAMETERS:  NUMBER OF VIEWS: One view.

TECHNIQUE: Single frontal radiographic view of the chest acquired.

RADIATION DOSE: NA

LIMITATIONS: None.



FINDINGS:  LUNGS AND PLEURA: No opacities, masses or pneumothorax. No pleural effusion.

MEDIASTINUM AND HILAR STRUCTURES: No masses.  Contour normal.

HEART AND VASCULAR STRUCTURES: Heart normal in size.  Normal vasculature.

BONES: No acute findings.

HARDWARE: None in the chest.

OTHER: No other significant finding.



IMPRESSION:  NO ACUTE RADIOGRAPHIC FINDING IN THE CHEST.



TECHNICAL DOCUMENTATION:  JOB ID:  [id number]

 2011 [employer]- All Rights Reserved



Reading location - IP/workstation name: [email or web address]

## 2021-01-02 NOTE — ER DOCUMENT REPORT
ED GI/





- General


Chief Complaint: Abdominal Pain


Stated Complaint: ABDOMINAL PAIN,WEAKNESS


Time Seen by Provider: 01/02/21 17:40


Primary Care Provider: 


PASQUALE HUA NP [Primary Care Provider] - Follow up as needed


Notes: 





Patient presents complaining of feeling lightheaded with nausea vomiting and 

diarrhea.  Patient reports dark urine and is concerned that she may be 

dehydrated.  Patient does complain of some epigastric pain with mild cough.  

Patient denies fever.  Patient reports dark-colored urine.  Patient complains of

generalized fatigue and not feeling well.  Patient does states she has had a 

colonoscopy within the past few months that only found a polyp which was removed

at that time.  Patient without any other acute findings.


TRAVEL OUTSIDE OF THE [place] IN LAST 30 DAYS: No





- HPI


Patient complains to provider of: Abdominal pain, Diarrhea, Vomiting


Pain Level: 3


Location: Epigastric


Associated symptoms: Blood in stool, Diarrhea, Nausea, Vomiting.  denies: Fever,

Urinary hesitancy, Urinary frequency


Exacerbated by: Denies


Relieved by: Denies


Similar symptoms previously: No


Recently seen / treated by doctor: No





- Related Data


Allergies/Adverse Reactions: 


                                        





No Known Drug Allergies Allergy (Verified 01/27/17 18:24)


   








Home Medications: norvasc.  prevacid.  melaxicam





Past Medical History





- General


Information source: Patient





- Social History


Smoking Status: Never Smoker


Chew tobacco use (# tins/day): No


Frequency of alcohol use: None


Drug Abuse: None


Family History: Reviewed & Not Pertinent


Patient has homicidal ideation: No





- Past Medical History


Cardiac Medical History: Reports: Hx Hypertension


Neurological Medical History: Reports: Hx Migraine


Renal/ Medical History: Denies: Hx Peritoneal Dialysis


GI Medical History: Reports: Other - Diverticulosis


Musculoskeletal Medical History: Reports Hx Arthritis - Low Back


Past Surgical History: Reports: Hx Tubal Ligation





- Immunizations


Hx Diphtheria, Pertussis, Tetanus Vaccination: Yes





Review of Systems





- Review of Systems


Constitutional: Malaise.  denies: Fever


EENT: No symptoms reported


Cardiovascular: No symptoms reported.  denies: Chest pain


Respiratory: No symptoms reported.  denies: Cough


Gastrointestinal: Abdominal pain, Diarrhea, Nausea, Vomiting


Genitourinary: No symptoms reported.  denies: Dysuria


Female Genitourinary: No symptoms reported


Musculoskeletal: No symptoms reported.  denies: Back pain


Skin: No symptoms reported


Hematologic/Lymphatic: No symptoms reported


Neurological/Psychological: No symptoms reported





Physical Exam





- Vital signs


Vitals: 


                                        











Temp Pulse Resp BP Pulse Ox


 


 98.6 F   103 H  20   156/84 H  97 


 


 01/02/21 12:01  01/02/21 12:01  01/02/21 12:01 01/02/21 12:01 01/02/21 12:01














- Notes


Notes: 





PHYSICAL EXAMINATION: 


GENERAL: Well-appearing and in no acute distress. 


HEAD: Atraumatic, normocephalic. 


EYES: sclera anicteric, conjunctiva are normal. 


ENT: nares patent. Moist mucous membranes. 


NECK: Normal range of motion, supple without lymphadenopathy 


LUNGS: CTAB and equal. No wheezes rales or rhonchi. 


HEART: Regular rate and rhythm without murmurs 


ABDOMEN: Soft, epigastric tenderness normal bowel sounds, no guarding. 


EXTREMITIES: Normal range of motion, no pitting edema. No cyanosis. 


BACK: No midline tenderness, no step-off or deformity. No CVA tenderness


NEUROLOGICAL: Cranial nerves grossly intact. Normal speech. Normal gait.


PSYCH: Normal mood, normal affect. 


SKIN: Warm, Dry, normal turgor, no rashes or lesions noted








Course





- Re-evaluation


Re-evalutation: 





01/02/21 18:25


Patient given GI cocktail, pain symptoms epigastric area resolved after 

cocktail.


01/02/21 18:42


Hemoccult negative, patient without any abnormal findings on rectal exam.  

Suspect likely gastroenteritis although patient does have a pending Covid test. 

Patient hemodynamically stable.  Patient reports feeling better after IV fluid 

administration.  Presentation of an overall well-appearing patient in no acute 

distress with complaints of nausea, vomiting, diarrhea.  This is consistent with

likely viral gastroenteritis.  Patient has no abdominal tenderness on exam and 

specifically no tenderness in the RLQ, LLQ, RUQ.  Overall well hydrated on exam.

 Able to tolerate oral intake here in the emergency department. Low clinical 

suspicion for any acute life-threatening etiology based on exam and history 

including acute cholecystitis, SBO, appendicitis, nephrolithiasis, or 

pylonephritis. CMP without evidence of acute hepatitis or significant 

dehydration.  Will plan for discharge at this time with return precautions and 

followup recommendations.


The patient was evaluated during the global Covid 19 pandemic, and that 

diagnosis was suspected/considered upon their initial presentation.  Their 

evaluation, treatment and testing was consistent with current guidelines for 

patients who present with complaints or symptoms that may be related to Covid 

19.





- Vital Signs


Vital signs: 


                                        











Temp Pulse Resp BP Pulse Ox


 


 98.6 F   88   20   140/88 H  99 


 


 01/02/21 12:01  01/02/21 18:39  01/02/21 12:01  01/02/21 18:39  01/02/21 18:39














- Laboratory Results


Result Diagrams: 


                                 01/02/21 13:59





                                 01/02/21 13:59


Laboratory Results Interpreted: 


                                        











  01/02/21 01/02/21 01/02/21





  13:55 13:59 13:59


 


Hgb   11.4 L 


 


Hct   34.2 L 


 


RDW   16.1 H 


 


Creatinine    0.43 L


 


Urine Ascorbic Acid  40 H  











01/02/21 23:05





                              Labs- All tests 24 hr











  01/02/21 01/02/21 01/02/21





  13:55 13:59 13:59


 


WBC   4.0 


 


RBC   4.05 


 


Hgb   11.4 L 


 


Hct   34.2 L 


 


MCV   84 


 


MCH   28.2 


 


MCHC   33.4 


 


RDW   16.1 H 


 


Plt Count   267 


 


Lymph % (Auto)   29.7 


 


Mono % (Auto)   8.1 


 


Eos % (Auto)   0.4 


 


Baso % (Auto)   0.6 


 


Absolute Neuts (auto)   2.5 


 


Absolute Lymphs (auto)   1.2 


 


Absolute Monos (auto)   0.3 


 


Absolute Eos (auto)   0.0 


 


Absolute Basos (auto)   0.0 


 


Seg Neutrophils %   61.2 


 


Sodium    139.5


 


Potassium    4.0


 


Chloride    107


 


Carbon Dioxide    24


 


Anion Gap    9


 


BUN    12


 


Creatinine    0.43 L


 


Est GFR ( Amer)    > 60


 


Est GFR (MDRD) Non-Af    > 60


 


Glucose    92


 


Calcium    9.3


 


Total Bilirubin    0.3


 


Direct Bilirubin    0.2


 


Neonat Total Bilirubin    Not Reportable


 


Neonat Direct Bilirubin    Not Reportable


 


Neonat Indirect Bili    Not Reportable


 


AST    22


 


ALT    15


 


Alkaline Phosphatase    81


 


Troponin I   


 


Total Protein    7.8


 


Albumin    4.1


 


Lipase    66.5


 


Serum HCG, Qual   


 


Urine Color  YELLOW  


 


Urine Appearance  SLIGHTLY-CLOUDY  


 


Urine pH  5.0  


 


Ur Specific Gravity  1.030  


 


Urine Protein  NEGATIVE  


 


Urine Glucose (UA)  NEGATIVE  


 


Urine Ketones  NEGATIVE  


 


Urine Blood  NEGATIVE  


 


Urine Nitrite  NEGATIVE  


 


Urine Bilirubin  NEGATIVE  


 


Urine Urobilinogen  NEGATIVE  


 


Ur Leukocyte Esterase  NEGATIVE  


 


Urine WBC (Auto)  0  


 


Urine RBC (Auto)  7  


 


Squamous Epi Cells Auto  1  


 


Urine Mucus (Auto)  FEW  


 


Urine Ascorbic Acid  40 H  


 


POC Stool Occult Blood   














  01/02/21 01/02/21 01/02/21





  13:59 13:59 18:29


 


WBC   


 


RBC   


 


Hgb   


 


Hct   


 


MCV   


 


MCH   


 


MCHC   


 


RDW   


 


Plt Count   


 


Lymph % (Auto)   


 


Mono % (Auto)   


 


Eos % (Auto)   


 


Baso % (Auto)   


 


Absolute Neuts (auto)   


 


Absolute Lymphs (auto)   


 


Absolute Monos (auto)   


 


Absolute Eos (auto)   


 


Absolute Basos (auto)   


 


Seg Neutrophils %   


 


Sodium   


 


Potassium   


 


Chloride   


 


Carbon Dioxide   


 


Anion Gap   


 


BUN   


 


Creatinine   


 


Est GFR ( Amer)   


 


Est GFR (MDRD) Non-Af   


 


Glucose   


 


Calcium   


 


Total Bilirubin   


 


Direct Bilirubin   


 


Neonat Total Bilirubin   


 


Neonat Direct Bilirubin   


 


Neonat Indirect Bili   


 


AST   


 


ALT   


 


Alkaline Phosphatase   


 


Troponin I   < 0.012 


 


Total Protein   


 


Albumin   


 


Lipase   


 


Serum HCG, Qual  NEGATIVE  


 


Urine Color   


 


Urine Appearance   


 


Urine pH   


 


Ur Specific Gravity   


 


Urine Protein   


 


Urine Glucose (UA)   


 


Urine Ketones   


 


Urine Blood   


 


Urine Nitrite   


 


Urine Bilirubin   


 


Urine Urobilinogen   


 


Ur Leukocyte Esterase   


 


Urine WBC (Auto)   


 


Urine RBC (Auto)   


 


Squamous Epi Cells Auto   


 


Urine Mucus (Auto)   


 


Urine Ascorbic Acid   


 


POC Stool Occult Blood    NEGATIVE











Critical Laboratory Results Reviewed: No Critical Results





- Radiology Results


Critical Radiology Results Reviewed: No Critical Results





- EKG Interpretation by Me


EKG shows normal: Sinus rhythm


Rate: Normal


Rhythm: NSR


Additional EKG results interpreted by me: 





01/02/21 18:47


Sinus rhythm with a rate of 90, QTc 416, no acute ischemic changes.





Discharge





- Discharge


Clinical Impression: 


 Encounter for screening for COVID-19, Gastroenteritis, Dehydration





Condition: Stable


Disposition: HOME, SELF-CARE


Instructions:  COVID-19 Guidance for Persons Under Investigation


Additional Instructions: 


Return immediately for any new or worsening symptoms





Followup with your primary care provider, call tomorrow to make a followup 

appointment





VOMITING:





     Vomiting (or nausea without vomiting) can be caused by many other different

problems. It can mean that something's wrong with the stomach, such as ulcers or

inflammation or the intestinal tract, such as appendicitis.  But it can also be 

a symptom of a problem that has nothing to do with the stomach or intestines. 

Vomiting is common with severe headaches, earaches, tonsillitis, and kidney 

infections, etc. We see it with pneumonia or heart attacks. Drugs can cause 

nausea and vomiting. Many abdominal problems cause vomiting; for example, 

gallstones, kidney stones, pancreatitis, and intestinal obstruction (blocked 

bowels).


     In most cases, curing the vomiting depends on fixing the problem that 

caused it. For temporary relief, we may use an anti-nausea medicine. For home 

use, we can prescribe suppositories, chewable pills, pills that dissolve in the 

mouth, or liquid anti-nausea drugs. If the vomiting seems to be caused by a 

problem in the stomach, acid-suppressing drugs may be prescribed as well.


     It's important to avoid dehydration. Sip small amounts of clear liquids 

(soft drinks, tea, broth, etc) . Try to take fluids frequently even if you are 

vomiting to prevent dehydration.  Take increasing amounts of fluid and when 

liquids are being consumed successfully, advance to small amounts of bland food 

(toast, soups, mashed potatoes, etc.) until you are able to resume a regular 

diet.  Avoid aspirin, tobacco, and alcohol.


     If the vomiting worsens, if the problem that's making you vomit worsens, or

if there's evidence of bleeding in the stomach (such as black, tarry stool, or 

bloody or black vomit), you should return immediately. Also, return if abdominal

pain worsens or becomes localized to one area or you develop high fever.  Call 

your doctor if you aren't improved in 24 hours.








DIARRHEA, NON-SPECIFIC:





     Diarrhea means frequent, watery stools. There are many causes. Any problem 

that keeps the intestinal tract from absorbing water from the stool can lead to 

diarrhea. 


     A sudden new diarrhea problem is usually caused by a virus, food 

sensitivity, toxic bacteria, or drugs. In this case, we expect the problem to go

away soon. Testing is done only if you seem seriously ill from the diarrhea.


     If you have chronic diarrhea, or diarrhea that keeps coming back, we need 

to find out why. Chronic diarrhea can be due to inflammation of the bowels such 

as Crohn's disease or ulcerative colitis, food sensitivity such as intolerance 

to lactose or wheat protein, irritable bowel syndrome, and other problems. If 

your diarrhea is a significant problem but it's not clear why you have it, we'll

refer you to a specialist for further testing.


     During an episode of diarrhea, drink small amounts (two to six ounces) of 

clear liquids (soft drinks, sport drinks, herb teas, broth, etc).  Take fluids 

frequently to prevent dehydration. It's usually not a problem to take mild anti-

diarrhea medication such as Kaopectate or Pepto-Bismol. As the diarrhea eases, 

advance to small amounts of bland food (mashed potato, toast) for 24 hours.


     Call the physician if blood appears in your vomit or stool, if vomiting 

lasts longer than 24 hours, if the abdominal pain worsens or becomes localized 

to one area, if you develop high fever, or if you become lightheaded and weak.








VIRAL SYNDROME:


     The physician has diagnosed a viral infection.  Viruses not only cause 

"colds," but can cause many different symptoms including generalized aching, 

fever, headache, cough, diarrhea, nausea, vomiting, and fatigue.


     The treatment, for the most part, is simply relief of symptoms. This means 

that antibiotics are usually not given.  Rest, fluids, pain medications and, 

occasionally, medication for the specific symptoms that are most bothersome will

be prescribed. Use good handwashing to avoid passing the virus to others. Shared

toys should be cleaned with disinfectant. Clean the toilets, sinks, and counter 

surfaces in bathrooms. Launder clothing in hot water.


     Contact the physician if you develop any new or unusual symptoms such as 

severe headache, stiff neck, high fever, chest pain, productive cough, or 

shortness of breath.  You should be rechecked if you don't see marked 

improvement within seven to 10 days.








INTRAVENOUS (I V) FLUIDS:


     As part of your care today, you received intravenous (IV) fluids.  IV 

fluids are administered to patients who are dehydrated or to those who have 

certain chemical (electrolyte) abnormalities that need correcting.








[place] MEDICATION:


     You have been given a medication to suppress nausea and vomiting. This type

of medication can be given as a shot, pill, or suppository. It will usually last

for many hours.  Pills and shots usually last six to eight hours.  For the 

typical illness, only one or two doses of the medication may be necessary.


     Mild lightheadedness may occur.  This type of medicine can cause drow

siness.  Do not drive or operate dangerous machinery while under its influence. 

Do not mix with alcohol.


     See your doctor at once if you have muscle spasms or tightness, or 

uncontrollable motions (particularly of the neck, mouth, or jaw). Persistent 

vomiting or severe lightheadedness should also be evaluated by the physician.








FOLLOW-UP CARE:


If you have been referred to a physician for follow-up care, call the 

physicians office for an appointment as you were instructed or within the next 

two days.  If you experience worsening or a significant change in your symptoms,

notify the physician immediately or return to the Emergency Department at any 

time for re-evaluation.


Prescriptions: 


Sucralfate [Carafate 1 gm Tablet] 1 gm PO ACHS #30 tablet


Ondansetron [Zofran Odt 4 mg Tablet] 1 tab PO Q6H #15 tab.melissa


Referrals: 


PASQUALE HUA, NP [Primary Care Provider] - Follow up as needed